# Patient Record
Sex: FEMALE | Race: WHITE | Employment: UNEMPLOYED | URBAN - METROPOLITAN AREA
[De-identification: names, ages, dates, MRNs, and addresses within clinical notes are randomized per-mention and may not be internally consistent; named-entity substitution may affect disease eponyms.]

---

## 2017-03-14 ENCOUNTER — OFFICE VISIT (OUTPATIENT)
Dept: NEUROLOGY | Age: 52
End: 2017-03-14

## 2017-03-14 VITALS
WEIGHT: 255 LBS | SYSTOLIC BLOOD PRESSURE: 130 MMHG | OXYGEN SATURATION: 98 % | HEIGHT: 60 IN | DIASTOLIC BLOOD PRESSURE: 90 MMHG | HEART RATE: 80 BPM | BODY MASS INDEX: 50.06 KG/M2

## 2017-03-14 DIAGNOSIS — M54.50 CHRONIC BILATERAL LOW BACK PAIN WITHOUT SCIATICA: Primary | ICD-10-CM

## 2017-03-14 DIAGNOSIS — M51.36 LUMBAR DEGENERATIVE DISC DISEASE: ICD-10-CM

## 2017-03-14 DIAGNOSIS — G89.29 CHRONIC BILATERAL LOW BACK PAIN WITHOUT SCIATICA: Primary | ICD-10-CM

## 2017-03-14 RX ORDER — DULOXETIN HYDROCHLORIDE 60 MG/1
60 CAPSULE, DELAYED RELEASE ORAL DAILY
COMMUNITY

## 2017-03-14 RX ORDER — METFORMIN HYDROCHLORIDE 500 MG/1
TABLET ORAL 2 TIMES DAILY WITH MEALS
COMMUNITY

## 2017-03-14 RX ORDER — DICLOFENAC SODIUM 75 MG/1
TABLET, DELAYED RELEASE ORAL 2 TIMES DAILY
COMMUNITY

## 2017-03-14 RX ORDER — BUPROPION HYDROCHLORIDE 300 MG/1
300 TABLET ORAL
COMMUNITY

## 2017-03-14 RX ORDER — TOPIRAMATE 100 MG/1
TABLET, FILM COATED ORAL 2 TIMES DAILY
COMMUNITY

## 2017-03-14 NOTE — PROGRESS NOTES
Name:  Misty Ruiz      :  1965    PCP:   Seda Swenson MD      Referring:  Self  MRN:   [id number]    Chief Complaint:   Chief Complaint   Patient presents with    Back Pain     lower back pain since        HISTORY OF PRESENT ILLNESS:     This is a 46 y.o. female who presents for evaluation of chronic, progressive lower back pain. The patient reports that she has had chronic back pain for many years and was diagnosed with lumbar degenerative disc disease as well as bone-spurs, then underwent what sounds like L4-5 diskectomy in - at [facility] for severe back pain and bilateral sciatic type pain. She says immediately after surgery, her leg pain was gone. Says she still had some residual back pain that got worse with activity but would get better if she rested. 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 About 2 years ago had, she had 2 [place] [facility] but says they didn't last more than a day or two. At this point, she describes pain as being across her lower back, upper buttocks, and wrapping around her hips and radiating or extending toward her groin. Pain is worse with walking for longer than 10 minutes, standing at home trying to wash dishes at sink or vacuum. Pain is reduced when she stops and sits down. Says legs feel weak but only rarely have given out on her. Is currently taking Voltaren 75 mg EC BID but says it's not helping. Has plans to do another round of physical therapy for her back pain. Saw Orthopedics 2 months ago and she says they didn't think they could help her with her chronic back pain but said if she was having radicular pain they could likely help with that via surgery.       Complete Review of Systems: reviewed on intake H&P; refer to media section; otherwise as noted in HPI No Known Allergies  History reviewed. No pertinent past medical history. Current Outpatient Prescriptions   Medication Sig Dispense Refill    metFORMIN (GLUCOPHAGE) 500 mg tablet Take  by mouth two (2) times daily (with meals).  diclofenac EC (VOLTAREN) 75 mg EC tablet Take  by mouth two (2) times a day.  buPROPion XL (WELLBUTRIN XL) 300 mg XL tablet Take 300 mg by mouth every morning.  DULoxetine (CYMBALTA) 60 mg capsule Take 60 mg by mouth daily.  topiramate (TOPAMAX) 100 mg tablet Take  by mouth two (2) times a day. Past Surgical History:   Procedure Laterality Date    HX BACK SURGERY      \"removed spurs off spine\"    HX CHOLECYSTECTOMY  2013     Family History   Problem Relation Age of Onset    Heart Disease Father      Social History     Social History    Marital status: SINGLE     Spouse name: N/A    Number of children: N/A    Years of education: N/A     Occupational History    Not on file. Social History Main Topics    Smoking status: Current Every Day Smoker     Types: Cigarettes    Smokeless tobacco: Not on file    Alcohol use Not on file    Drug use: Not on file    Sexual activity: Not on file     Other Topics Concern    Not on file     Social History Narrative    No narrative on file       PHYSICAL EXAM  Vitals:    03/14/17 0903   BP: 130/90   BP 1 Location: Left arm   BP Patient Position: Sitting   Pulse: 80   SpO2: 98%   Weight: 115.7 kg (255 lb)   Height: 5' (1.524 m)     General: awake, alert, oriented, conversant, NAD  CV: regular rate, rhythm  Pulm: CTA  Ext: no edema    MSK:  Lumbar spine  No tenderness across lower back  + pain with back flexion = to extension  No clear worsening of pain with facet loading to either side  SLR negative on both sides  + MICH on right, negative on left    Focused Neurological Exam     Mental status: Alert and oriented to person, place situation. Language: normal fluency and comprehension; no dysarthria.       CNs: Visual fields grossly normal  Extraocular movements intact, no nystagmus  Face appears symmetric and facial strength normal.    Hearing is intact to casual conversation. Sensory: intact light touch in both lower extremities  Motor: Normal bulk, tone, and strength in all 4 extremities. Reflexes: DTRs are symmetric, 2+, no sustained clonus   Gait: normal observed gait      No outside clinic notes/ imaging reports available for review    ASSESSMENT AND PLAN    ICD-10-CM ICD-9-CM    1. Chronic bilateral low back pain without sciatica M54.5 724.2 XR SI JTS MIN 3 V    G89.29 338.29 MRI LUMB SPINE W WO CONT   2. Lumbar degenerative disc disease M51.36 722. [address] CONT       46 y.o. female with hx of lumbar spine diskectomy (L4-5, 2003-4) with recurring, progressive lower back pain and pain radiating around hips to groin. Ddx: lumbar spinal stenosis with neurogenic claudication vs new disc herniation vs SI joint arthropathy    Check MRI L-spine with and without contrast (due to prior l-spine surgery), XR SI joints. Pt to follow up to go over results and discuss any injection options (i.e SI Joint injection, lumbar ISAIAS, facet joint injection, etc) that may alleviate her back pain complaints.

## 2017-03-14 NOTE — MR AVS SNAPSHOT
Visit Information Date & Time Provider Department Dept. Phone Encounter #  
 3/14/2017  9:00 AM Ho Butts MD [facility] [phone] [id number] Follow-up Instructions Return in about 6 weeks (around 4/25/2017). Allergies as of 3/14/2017  Review Complete On: 3/14/2017 By: Raquel Zapien LPN No Known Allergies Current Immunizations  Never Reviewed No immunizations on file. Not reviewed this visit You Were Diagnosed With   
  
 Codes Comments Chronic bilateral low back pain without sciatica    -  Primary ICD-10-CM: M54.5, G89.29 ICD-9-CM: 724.2, 338.29 Lumbar degenerative disc disease     ICD-10-CM: M51.36 
ICD-9-CM: 722.52 Vitals BP Pulse Height(growth percentile) Weight(growth percentile) SpO2 BMI  
 130/90 (BP 1 Location: Left arm, BP Patient Position: Sitting) 80 5' (1.524 m) 255 lb (115.7 kg) 98% 49.8 kg/m2 Smoking Status Current Every Day Smoker BMI and BSA Data Body Mass Index Body Surface Area  
 49.8 kg/m 2 2.21 m 2 Your Updated Medication List  
  
   
This list is accurate as of: 3/14/17  9:36 AM.  Always use your most recent med list.  
  
  
  
  
 buPROPion  mg XL tablet Commonly known as:  [place] Take 300 mg by mouth every morning. diclofenac EC 75 mg EC tablet Commonly known as:  VOLTAREN Take  by mouth two (2) times a day. DULoxetine 60 mg capsule Commonly known as:  CYMBALTA Take 60 mg by mouth daily. metFORMIN 500 mg tablet Commonly known as:  GLUCOPHAGE Take  by mouth two (2) times daily (with meals). topiramate 100 mg tablet Commonly known as:  TOPAMAX Take  by mouth two (2) times a day. Follow-up Instructions Return in about 6 weeks (around 4/25/2017). To-Do List   
 03/14/2017 Imaging:  MRI LUMB SPINE W WO CONT   
  
 03/14/2017   Imaging:  XR SI JTS MIN 3 V   
  
  
 Patient Instructions A Healthy Lifestyle: Care Instructions Your Care Instructions A healthy lifestyle can help you feel good, stay at a healthy weight, and have plenty of energy for both work and play. A healthy lifestyle is something you can share with your whole family. A healthy lifestyle also can lower your risk for serious health problems, such as high blood pressure, heart disease, and diabetes. You can follow a few steps listed below to improve your health and the health of your family. Follow-up care is a key part of your treatment and safety. Be sure to make and go to all appointments, and call your doctor if you are having problems. Its also a good idea to know your test results and keep a list of the medicines you take. How can you care for yourself at home? · Do not eat too much sugar, fat, or fast foods. You can still have dessert and treats now and then. The goal is moderation. · Start small to improve your eating habits. Pay attention to portion sizes, drink less juice and soda pop, and eat more fruits and vegetables. ¨ Eat a healthy amount of food. A 3-ounce serving of meat, for example, is about the size of a deck of cards. Fill the rest of your plate with vegetables and whole grains. ¨ Limit the amount of soda and sports drinks you have every day. Drink more water when you are thirsty. ¨ Eat at least 5 servings of fruits and vegetables every day. It may seem like a lot, but it is not hard to reach this goal. A serving or helping is 1 piece of fruit, 1 cup of vegetables, or 2 cups of leafy, raw vegetables. Have an apple or some carrot sticks as an afternoon snack instead of a candy bar. Try to have fruits and/or vegetables at every meal. 
· Make exercise part of your daily routine. You may want to start with simple activities, such as walking, bicycling, or slow swimming. Try to be active 30 to 60 minutes every day.  You do not need to do all 30 to 60 minutes all at once. For example, you can exercise 3 times a day for 10 or 20 minutes. Moderate exercise is safe for most people, but it is always a good idea to talk to your doctor before starting an exercise program. 
· Keep moving. Giovana Levy the lawn, work in the garden, or [employer]. Take the stairs instead of the elevator at work. · If you smoke, quit. People who smoke have an increased risk for heart attack, stroke, cancer, and other lung illnesses. Quitting is hard, but there are ways to boost your chance of quitting tobacco for good. ¨ Use nicotine gum, patches, or lozenges. ¨ Ask your doctor about stop-smoking programs and medicines. ¨ Keep trying. In addition to reducing your risk of diseases in the future, you will notice some benefits soon after you stop using tobacco. If you have shortness of breath or asthma symptoms, they will likely get better within a few weeks after you quit. · Limit how much alcohol you drink. Moderate amounts of alcohol (up to 2 drinks a day for men, 1 drink a day for women) are okay. But drinking too much can lead to liver problems, high blood pressure, and other health problems. Family health If you have a family, there are many things you can do together to improve your health. · Eat meals together as a family as often as possible. · Eat healthy foods. This includes fruits, vegetables, lean meats and dairy, and whole grains. · Include your family in your fitness plan. Most people think of activities such as jogging or tennis as the way to fitness, but there are many ways you and your family can be more active. Anything that makes you breathe hard and gets your heart pumping is exercise. Here are some tips: 
¨ Walk to do errands or to take your child to school or the bus. ¨ Go for a family bike ride after dinner instead of watching TV. Where can you learn more? Go to http://jamilah-adrián.info/. Enter E730 in the search box to learn more about \"A Healthy Lifestyle: Care Instructions. \" Current as of: July 26, 2016 Content Version: 11.1 © 4548-5714 [employer]. Care instructions adapted under license by [employer] (which disclaims liability or warranty for this information). If you have questions about a medical condition or this instruction, always ask your healthcare professional. [address] any warranty or liability for your use of this information. Introducing [facility] & HEALTH SERVICES! [employer] introduces [employer] patient portal. Now you can access parts of your medical record, email your doctor's office, and request medication refills online. 1. In your internet browser, go to [email or web address] 2. Click on the First Time User? Click Here link in the Sign In box. You will see the New Member Sign Up page. 3. Enter your [employer] Access Code exactly as it appears below. You will not need to use this code after youve completed the sign-up process. If you do not sign up before the expiration date, you must request a new code. · [employer] Access Code: [email or web address] Expires: 6/12/2017  9:36 AM 
 
4. Enter the last four digits of your Social Security Number (xxxx) and Date of Birth (mm/dd/yyyy) as indicated and click Submit. You will be taken to the next sign-up page. 5. Create a [employer] ID. This will be your [employer] login ID and cannot be changed, so think of one that is secure and easy to remember. 6. Create a [employer] password. You can change your password at any time. 7. Enter your Password Reset Question and Answer. This can be used at a later time if you forget your password. 8. Enter your e-mail address. You will receive e-mail notification when new information is available in [address] 9. Click Sign Up. You can now view and download portions of your medical record. 10. Click the Download Summary menu link to download a portable copy of your medical information. If you have questions, please visit the Frequently Asked Questions section of the [employer] website. Remember, [employer] is NOT to be used for urgent needs. For medical emergencies, dial 911. Now available from your iPhone and Android! Please provide this summary of care documentation to your next provider. Your primary care clinician is listed as Kristie Zuluaga. If you have any questions after today's visit, please call [phone].

## 2017-03-14 NOTE — PATIENT INSTRUCTIONS

## 2017-03-15 ENCOUNTER — OP HISTORICAL/CONVERTED ENCOUNTER (OUTPATIENT)
Dept: OTHER | Age: 52
End: 2017-03-15

## 2017-04-04 ENCOUNTER — HOSPITAL ENCOUNTER (OUTPATIENT)
Dept: MRI IMAGING | Age: 52
Discharge: HOME | End: 2017-04-04
Attending: PSYCHIATRY & NEUROLOGY
Payer: MEDICARE

## 2017-04-04 ENCOUNTER — HOSPITAL ENCOUNTER (OUTPATIENT)
Dept: GENERAL RADIOLOGY | Age: 52
Discharge: HOME | End: 2017-04-04
Attending: PSYCHIATRY & NEUROLOGY
Payer: MEDICARE

## 2017-04-04 VITALS — BODY MASS INDEX: 49.8 KG/M2 | WEIGHT: 255 LBS

## 2017-04-04 DIAGNOSIS — M51.36 LUMBAR DEGENERATIVE DISC DISEASE: ICD-10-CM

## 2017-04-04 DIAGNOSIS — M54.50 CHRONIC BILATERAL LOW BACK PAIN WITHOUT SCIATICA: ICD-10-CM

## 2017-04-04 DIAGNOSIS — G89.29 CHRONIC BILATERAL LOW BACK PAIN WITHOUT SCIATICA: ICD-10-CM

## 2017-04-04 LAB — CREAT BLD-MCNC: 0.6 MG/DL (ref 0.6–1.3)

## 2017-04-04 PROCEDURE — 74011250636 HC RX REV CODE- 250/636: Performed by: RADIOLOGY

## 2017-04-04 PROCEDURE — 72158 MRI LUMBAR SPINE W/O & W/DYE: CPT

## 2017-04-04 PROCEDURE — A9585 GADOBUTROL INJECTION: HCPCS | Performed by: RADIOLOGY

## 2017-04-04 PROCEDURE — 82565 ASSAY OF CREATININE: CPT

## 2017-04-04 PROCEDURE — 72202 X-RAY EXAM SI JOINTS 3/> VWS: CPT

## 2017-04-04 RX ADMIN — GADOBUTROL 10 ML: 604.72 INJECTION INTRAVENOUS at 18:15

## 2017-04-05 ENCOUNTER — OP HISTORICAL/CONVERTED ENCOUNTER (OUTPATIENT)
Dept: OTHER | Age: 52
End: 2017-04-05

## 2017-04-06 ENCOUNTER — TELEPHONE (OUTPATIENT)
Dept: NEUROLOGY | Age: 52
End: 2017-04-06

## 2017-05-02 ENCOUNTER — TELEPHONE (OUTPATIENT)
Dept: NEUROLOGY | Age: 52
End: 2017-05-02

## 2017-05-02 ENCOUNTER — OFFICE VISIT (OUTPATIENT)
Dept: NEUROLOGY | Age: 52
End: 2017-05-02

## 2017-05-02 VITALS
DIASTOLIC BLOOD PRESSURE: 98 MMHG | HEIGHT: 60 IN | HEART RATE: 94 BPM | WEIGHT: 255 LBS | OXYGEN SATURATION: 98 % | BODY MASS INDEX: 50.06 KG/M2 | SYSTOLIC BLOOD PRESSURE: 140 MMHG

## 2017-05-02 DIAGNOSIS — M54.10 RADICULAR SYNDROME OF RIGHT LEG: ICD-10-CM

## 2017-05-02 DIAGNOSIS — M54.50 SEVERE LOW BACK PAIN: ICD-10-CM

## 2017-05-02 DIAGNOSIS — M48.062 NEUROGENIC CLAUDICATION DUE TO LUMBAR SPINAL STENOSIS: Primary | ICD-10-CM

## 2017-05-02 DIAGNOSIS — M47.816 LUMBAR FACET ARTHROPATHY: ICD-10-CM

## 2017-05-02 PROBLEM — G89.29 CHRONIC BILATERAL LOW BACK PAIN WITHOUT SCIATICA: Status: RESOLVED | Noted: 2017-03-14 | Resolved: 2017-05-02

## 2017-05-02 RX ORDER — GABAPENTIN 300 MG/1
CAPSULE ORAL
Qty: 90 CAP | Refills: 1 | Status: SHIPPED | OUTPATIENT
Start: 2017-05-02

## 2017-05-02 NOTE — TELEPHONE ENCOUNTER
----- Message from Roxie Chatterjee MD sent at 5/2/2017  4:16 PM EDT -----  Let pt know that I sent a Rx for gabapentin 300 mg capsule to her pharmacy for her back pain and right leg pain

## 2017-05-02 NOTE — MR AVS SNAPSHOT
Visit Information Date & Time Provider Department Dept. Phone Encounter #  
 5/2/2017  1:40 PM Heron Villa MD [address] [place] [address] Neurology Clinic [phone] [id number] Upcoming Health Maintenance Date Due Hepatitis C Screening 1965 Pneumococcal 19-64 Medium Risk (1 of 1 - PPSV23) 12/13/1982 DTaP/Tdap/Td series (1 - Tdap) 12/13/1984 PAP AKA CERVICAL CYTOLOGY 12/13/1984 BREAST CANCER SCRN MAMMOGRAM 12/13/2013 FOBT Q 1 YEAR AGE 50-75 12/13/2013 INFLUENZA AGE 9 TO ADULT 8/1/2017 Allergies as of 5/2/2017  Review Complete On: 3/14/2017 By: Ashwini Samson LPN No Known Allergies Current Immunizations  Never Reviewed No immunizations on file. Not reviewed this visit You Were Diagnosed With   
  
 Codes Comments Chronic bilateral low back pain without sciatica    -  Primary ICD-10-CM: M54.5, G89.29 ICD-9-CM: 724.2, 338.29 Vitals BP Pulse Height(growth percentile) Weight(growth percentile) SpO2 BMI  
 (!) 140/98 (BP 1 Location: Left arm, BP Patient Position: Sitting) 94 5' (1.524 m) 255 lb (115.7 kg) 98% 49.8 kg/m2 Smoking Status Current Every Day Smoker BMI and BSA Data Body Mass Index Body Surface Area  
 49.8 kg/m 2 2.21 m 2 Your Updated Medication List  
  
   
This list is accurate as of: 5/2/17  2:22 PM.  Always use your most recent med list.  
  
  
  
  
 buPROPion  mg XL tablet Commonly known as:  Merilynn Alexsandra Take 300 mg by mouth every morning. diclofenac EC 75 mg EC tablet Commonly known as:  VOLTAREN Take  by mouth two (2) times a day. DULoxetine 60 mg capsule Commonly known as:  CYMBALTA Take 60 mg by mouth daily. metFORMIN 500 mg tablet Commonly known as:  GLUCOPHAGE Take  by mouth two (2) times daily (with meals). topiramate 100 mg tablet Commonly known as:  TOPAMAX Take  by mouth two (2) times a day. Introducing [facility] & HEALTH SERVICES! Kallie Puente introduces [employer] patient portal. Now you can access parts of your medical record, email your doctor's office, and request medication refills online. 1. In your internet browser, go to [email or web address] 2. Click on the First Time User? Click Here link in the Sign In box. You will see the New Member Sign Up page. 3. Enter your [employer] Access Code exactly as it appears below. You will not need to use this code after youve completed the sign-up process. If you do not sign up before the expiration date, you must request a new code. · [employer] Access Code: [email or web address] Expires: 6/12/2017  9:36 AM 
 
4. Enter the last four digits of your Social Security Number (xxxx) and Date of Birth (mm/dd/yyyy) as indicated and click Submit. You will be taken to the next sign-up page. 5. Create a [employer] ID. This will be your [employer] login ID and cannot be changed, so think of one that is secure and easy to remember. 6. Create a [employer] password. You can change your password at any time. 7. Enter your Password Reset Question and Answer. This can be used at a later time if you forget your password. 8. Enter your e-mail address. You will receive e-mail notification when new information is available in [address] 9. Click Sign Up. You can now view and download portions of your medical record. 10. Click the Download Summary menu link to download a portable copy of your medical information. If you have questions, please visit the Frequently Asked Questions section of the [employer] website. Remember, [employer] is NOT to be used for urgent needs. For medical emergencies, dial 911. Now available from your iPhone and Android! Please provide this summary of care documentation to your next provider. Your primary care clinician is listed as Kaden Hartley. If you have any questions after today's visit, please call [phone].

## 2017-05-02 NOTE — TELEPHONE ENCOUNTER
Contacted patient and made her aware of gabapentin. She voiced understanding and will call back if any further questions or concerns.

## 2017-05-02 NOTE — PROGRESS NOTES
Interval HPI:   This is a 46 y.o. female who is following up for     Chief Complaint   Patient presents with    Back Pain    Results     Reviewed MRI L-spine report and images with patient and her mother. MRI L-spine shows multi-level facet arthropathy, moderate to severe spinal stenosis at L3-4 (none at L4-5 or L5-S1), minimal leftward disc protrusion at L2-3, degenerative discs at L4-5 and L5-S1 (not seen at upper levels), and severe right foraminal narrowing at L5-S1, with evidence of prior right tevin-laminectomy. XR SI joints shows arthritis in both hips. Pt says that her back pain has worsened. She started PT 1 month ago but back pain has gradually gotten worse, not better. She has constant pain in the center of her lower back, now radiating to her right groin, not down back of her right leg, and no pain in her left leg. She can't find a comfortable position to sit or  for very long, has to keep changing positions. She says and mother confirms from observation that her pain is severe. Brief ROS: as above or otherwise negative  There have been no significant changes in PMHx, PSHx, SHx except as noted above. No Known Allergies  Current Outpatient Prescriptions   Medication Sig Dispense Refill    gabapentin (NEURONTIN) 300 mg capsule One capsule two to three times in a day. For back pain and right leg/ groin pain. Caution: may cause drowsiness 90 Cap 1    metFORMIN (GLUCOPHAGE) 500 mg tablet Take  by mouth two (2) times daily (with meals).  diclofenac EC (VOLTAREN) 75 mg EC tablet Take  by mouth two (2) times a day.  buPROPion XL (WELLBUTRIN XL) 300 mg XL tablet Take 300 mg by mouth every morning.  DULoxetine (CYMBALTA) 60 mg capsule Take 60 mg by mouth daily.  topiramate (TOPAMAX) 100 mg tablet Take  by mouth two (2) times a day.          Physical Exam  Blood pressure (!) 140/98, pulse 94, height 5' (1.524 m), weight 115.7 kg (255 lb), SpO2 98 %.    Appears in moderate distress due to ongoing back pain. Frequently moves from sitting to standing to alleviate the pain  Neck: no stiffness  Skin: no rashes    MSK:  Lumbar spine:   + tenderness in center and to right of lower lumbar area  Mild pain with back flexion, worse pain with back extension  Mild worsening with rotation to right; minimal pain with rotation to left    Focused Neurological Exam     Mental status: Alert and oriented to person, place situation. Language: normal fluency and comprehension; no dysarthria. CNs:   Visual fields grossly normal  Extraocular movements intact, no nystagmus  Face appears symmetric and facial strength normal.    Hearing is intact to casual conversation. Sensory: intact light touch in both legs  Motor: Normal bulk and strength in all 4 extremities. Reflexes: not examined today  Gait: appears antalgic    Impression      ICD-10-CM ICD-9-CM    1. Neurogenic claudication due to lumbar spinal stenosis M48.06 724.03 gabapentin (NEURONTIN) 300 mg capsule   2. Severe low back pain M54.5 724.2 gabapentin (NEURONTIN) 300 mg capsule   3. Radicular syndrome of right leg M54.10 724.4 gabapentin (NEURONTIN) 300 mg capsule   4. Lumbar facet arthropathy (HCC) M12.88 721.3        Reviewed MRI L-spine findings with patient/ mother. Discussed that back pain may be coming from the spinal stenosis or the facet arthropathy and that the pain radiating into her right groin may be from the spinal stenosis at L3-4 or from the arthritis in her hip. Discussed treatment options: epidural steroid injection at L3-4 vs lumbar facet joint steroid injections vs right SI joint injection (each of which would address different issues), versus seeing spine surgeon to discuss surgery. Patient has extreme anxiety and is nervous about the thought of injection but says she would like to try back injection to see if it would alleviate her back pain.   She says she will need Valium to get through the procedure. Discussed with her that we will do a bilateral L3-4 transforaminal Epidural Steroid Injection to get the steroid close to the disc and close to the L3 nerve root on the right which may be the source of her right groin pain. Discussed potential benefits and risks in detail. Pt wants to proceed. Will submit PA paperwork and schedule accordingly. Sent Rx for Gabapentin 300 mg one capsule two to 3 times a day for back pain and right leg pain.

## 2017-05-03 ENCOUNTER — OP HISTORICAL/CONVERTED ENCOUNTER (OUTPATIENT)
Dept: OTHER | Age: 52
End: 2017-05-03

## 2017-05-16 ENCOUNTER — TELEPHONE (OUTPATIENT)
Dept: NEUROLOGY | Age: 52
End: 2017-05-16

## 2017-05-16 NOTE — TELEPHONE ENCOUNTER
Received [facility]  PA approval letter regarding [employer]  LESEULA  L3-4 CPT 06761  Auth#[id number]  Effective:5/11/17-6/25/17    Marques Roles informed for scheduling

## 2017-06-01 ENCOUNTER — OP HISTORICAL/CONVERTED ENCOUNTER (OUTPATIENT)
Dept: OTHER | Age: 52
End: 2017-06-01

## 2017-06-12 NOTE — TELEPHONE ENCOUNTER
Contacted patient and informed her the steroid injections were approved. She states she is going to her doctor today at 10:30am and wants to talk with her first before pursuing the injections. She states she has done them before and they did not help.  She will call back if she decides to pursue

## 2018-08-31 ENCOUNTER — ED HISTORICAL/CONVERTED ENCOUNTER (OUTPATIENT)
Dept: OTHER | Age: 53
End: 2018-08-31

## 2022-03-18 PROBLEM — M54.50 SEVERE LOW BACK PAIN: Status: ACTIVE | Noted: 2017-05-02

## 2022-03-18 PROBLEM — M47.816 LUMBAR FACET ARTHROPATHY: Status: ACTIVE | Noted: 2017-05-02

## 2022-03-19 PROBLEM — M54.10 RADICULAR SYNDROME OF RIGHT LEG: Status: ACTIVE | Noted: 2017-05-02

## 2022-03-19 PROBLEM — M48.062 NEUROGENIC CLAUDICATION DUE TO LUMBAR SPINAL STENOSIS: Status: ACTIVE | Noted: 2017-05-02

## 2022-03-19 PROBLEM — M51.36 LUMBAR DEGENERATIVE DISC DISEASE: Status: ACTIVE | Noted: 2017-03-14

## 2022-04-12 ENCOUNTER — CLINICAL SUPPORT (OUTPATIENT)
Dept: DIABETES SERVICES | Age: 57
End: 2022-04-12
Payer: MEDICARE

## 2022-04-12 DIAGNOSIS — E11.65 TYPE 2 DIABETES MELLITUS WITH HYPERGLYCEMIA, WITHOUT LONG-TERM CURRENT USE OF INSULIN (HCC): Primary | ICD-10-CM

## 2022-04-12 PROCEDURE — G0108 DIAB MANAGE TRN  PER INDIV: HCPCS | Performed by: DIETITIAN, REGISTERED

## 2022-04-12 NOTE — PROGRESS NOTES
[employer] Program for Diabetes Health  Diabetes Self-Management Education & Support Program  Pre-program Assessment    Reason for Referral: Diabetes Education  Referral Source: Julianne Sanchez NP  Services requested: DSMES    ASSESSMENT    From my perspective, the participant would benefit from [facility] specifically related to Reducing risks, Healthy eating, Monitoring, Physical activity and Problem solving. Will adapt DSMES program to build on participant's preparedness score as noted in the Diabetes Skills, Confidence, and Preparedness Index. During the program, we will focus on providing DSMES that specifically addresses participant's interest in Reducing risks, Healthy eating, Monitoring and Problem solving, as shown by their reported readiness to change. Recently made dietary changes to assist with weight loss. She reports eliminating intake of all breads. She has lost weight [from 260# to 248# in less than one month] but does not feel as thought she has more energy. The participant would be best served by attending weekly group class series. She is leaving May 8-15 for vacation. She will be scheduled to take 1/2 of Class 2 before vacation and the second 1/2 of Class 2 the same week of Class 3. Diabetes Self-Management Education Follow-up Visit: May 2022 [facility] Group Classes       Clinical Presentation  Zoila Jean is a 60 y.o. White female referred for diabetes self-management education. Participant has Type 2 DM on insulin for 1-10 years. Family history positive for diabetes. Patient reports receiving DSMES services in the past. Most recent A1c value: No results found for: HBA1C, [id number], [id number]    Diabetes-related medical history:  Other associated conditions     Depression    Diabetes-related medications:  Current dosing:   Key Antihyperglycemic Medications             metFORMIN (GLUCOPHAGE) 500 mg tablet Take  by mouth two (2) times daily (with meals).           Blood Pressure Management  Key ACE/ARB Medications     Patient is on no ACE or ARB meds. Lipid Management  Key Antihyperlipidemia Meds     The patient is on no antihyperlipidemia meds. Clot Prevention  Key Anti-Platelet Anticoagulant Meds     The patient is on no antiplatelet meds or anticoagulants. Learning Assessment  Learning objectives Educator assessment (4/12/2022)   Diabetes Disease Process  The participant can   A) describe diabetes in basic terms;   B) state the type of diabetes they have; &   C) state accepted blood glucose targets. Healthy Eating  The participant can   A) identify carbohydrate foods; &   B) accurately read food labels. Being Active  The participant can  A) state the benefits of physical activity;  B) report their current PA practices;  C) identify PA they would consider incorporating in their lives; &  D) develop an implementation plan. Monitoring  The participant can  A) operate their blood glucose meter; &  B) describe how they log their blood glucoses to share with their provider. Taking Medications  The participant can  A) name their diabetes medications;  B) state the purpose and dose;  C) note side effects; &  D) describe proper storage, disposal & transport (if appropriate). Healthy Coping  The participant can    A) describe their response to diabetes diagnosis; B) describe their specific coping mechanisms;  C) identify supportive people and/or other resources that positively support their diabetes self-care and health. Reducing Risks  The participant can describe the preventive measures used by providers to promote health and prevent diabetes complications. Problem Solving  The participant can   A) identify signs, symptoms & treatment of hypoglycemia;   B) identify signs, symptoms & treatment of hyperglycemia;  C) describe their sick day plan; &  D) identify BG patterns to discuss with their provider. No  Yes  Yes        Yes  Yes        Yes  No  Yes, Physical Therapy was working for her. She stopped  Yes      Yes  Provider does not ask her about this. She has not brought in her meter.       Yes  No  Yes  Yes        Yes, Just dealt with it  No  Yes        Yes          No  No  No  No     Characteristics to Learning   Barriers to Learning   [] Cognitive loss  [] Mental retardation   [] Intellectual delay/cognitive impairment  [] Psychiatric disorder  [] Visually impaired  [] Hearing loss                 [] Low literacy (difficulty with written text)  [] Low numeracy (difficulty with mathematical information  [] Low health literacy (difficulty with understanding health information & services  [] Language  [] Functional limitation  [] Pain   [] Financial  [] Transportation  [x] None   Favorite Ways to Learn   [] Lecture  [] Slides  [] Reading [] Video-Internet  [] Cassettes/CDs/MP3's  [x] Interactive Small Groups [] Other       Behavioral Assessment  Current self-care practices  Educator assessment (4/12/2022)   Healthy Eating  Current practices  24-hour Dietary Recall:  Breakfast: plain yogurt x1 cup and 1/2 cup bluberries  Lunch: skipped/usually skips breakfast  Dinner: broccoli, broiled hamburgers  Snacks: mozzarella sticks, pork rinds; serene/johanna gonache ice cream-maybe every other day  Beverages: coffee, water,diet coke  Alcohol: none     Would benefit from DSMES related to Healthy Eating: Yes      Eats a carbohydrate controlled diet: No      Stage of change: Action   Being Active  Current practices  How many days during the past week have you performed physical activity where your heart beats faster and your breathing is harder than normal for 30 minutes or more?  0 days    How many days in a typical week do you perform activity such as this?  0 days     Would benefit from [facility] related to Being Active: Yes      Exercises 150 minutes/week: No      Stage of change: Precontemplation     Monitoring  Current practices  Do you monitor your blood sugar? Yes    How often do you monitor? 2x/week; once per day when she does    What are the range of readings? 112 mg/dL    Do you know your last A1c measurement? No    Do you know the meaning of the A1c? No     Would benefit from [facility] related to Monitoring: Yes      Uses BG readings to establish trends and understand BG patterns: No      Stage of change: Preparation   Taking Medication  Current practices  Do you understand what your diabetes medications do? No    How often do you miss doses of your diabetes medications? Never    Can you afford your diabetes medications? Yes   Would benefit from [facility] related to Taking Medication: Yes      Takes medications consistently to receive full benefit: Yes      Stage of change: Action       Healthy Coping   Current state  Diabetes Skills, Confidence and Preparedness Index:  Overall SCPI score: 4.7   Skills Score: 3.9  Confidence Score: 5.1  Preparedness Score: 5.3   Would benefit from DSMES related to Healthy Coping: Yes      Identifies specific people, organizations,etc, that actively support their diabetes self-care efforts: Yes; she lives with her sister, her mom and maternal sister      Stage of change: Action     Reducing Risks  Current state  Vaccines:  Influenza: There is no immunization history on file for this patient. Pneumococcal:   There is no immunization history on file for this patient. Hepatitis:   There is no immunization history on file for this patient. Examinations:  No Diabetic HM Topics for this patient     Dental exam: DUE    Foot exam: DUE    Heart Protection:  BP Readings from Last 2 Encounters:   05/02/17 (!) 140/98   03/14/17 130/90        No results found for: LDL, LDLC, DLDLP     Kidney Protection:  No results found for: MCACR, MCA1, MCA2, MCA3, MCAU, MCAU2, MCALPOCT     Would benefit from [facility] related to Reducing Risks:  Yes      Actively participates in decision-making with provider regarding secondary prevention:  Yes      Stage of change: Action   Problem Solving  Current state  Hypoglycemia Management:  What are signs and symptoms of hypoglycemia that you experience? Pt reported being unaware of s/s of hypoglycemia    How do you prevent hypoglycemia? Pt reported being unaware of how to prevent hypoglycemia    How do you treat hypoglycemia? Pt reported being unaware of how to treat hypoglycemia    Hyperglycemia Management:  What are signs and symptoms of hyperglycemia that you experience? Pt reported being unaware of s/s of hyperglycemia    How can you prevent hyperglycemia? Pt reported being unaware of how to prevent hyperglycemia    Sick Day Management:  What do you do differently on sick days? Pt reported being unaware of self-management on sick days    Pattern Management:  Do you notice blood glucose patterns when you look at the readings in your meter or logbook? No    How do you use the blood glucose readings from your meter or logbook? Pt reported being unaware of pattern management skills     Would benefit from [facility] related to Problem Solving: Yes      Articulates appropriate strategies to address hypoglycemia, hyperglycemia, sick day care and BG pattern: No      Stage of change: Precontemplation     Note: Content derived from the American Association of Diabetes Educators' Diabetes Education Curriculum: A Guide to Successful Self-Management (3rd edition)      Kristen Wang RD on 4/12/2022 at 2:24 PM    I have personally reviewed the health record, including provider notes, laboratory data and current medications before making these care and education recommendations. The time spent in this effort is included in the total time. Total minutes:30    Overall SCPI score: 4.7   Skills Score: 3.9  Confidence Score: 5.1  Preparedness Score: 5.3    Skills/Knowledge Questions  1. I know how to plan meals that have the best balance between carbohydrates, proteins and vegetables. 5  2.  I know how my diabetes medications (pills, injectables and/or insulin) work in my body. 5  3. I know when to check my blood sugar if I want to see how my body responded to a meal. 6  4. I know when to check my blood sugars to determine if my medication or insulin doses are correct. 6  5. I know what to do to prevent a low blood sugar when I exercise (either before, during, or after). 2  6. When I am sick, I know what to do differently with my diabetes management. 2  7. I know how stress can affect my diabetes management. 2  8. When I look at my blood sugars over a given week, I can explain what my blood sugar pattern is. 2  9. I know what my target levels are for A1c, blood pressure and cholesterol. 5  Confidence Questions  1. I am confident that I can plan balanced meals and snacks. 6  2. I am confident that I can manage my stress. 6  3. I am confident that I can prevent a low blood sugar during or after exercise. 2  4. I am confident that the next time I eat out, I will be able to choose foods that best keep my blood sugars in target. 5  5. I am confident I can include exercise into my schedule. 5  6. I am confident that I can use my daily blood sugars to adjust my diet, my activity, and/or my insulin. 6  7. When something out of my normal routine happens, I am confident that I can problem-solve and keep my diabetes on track. 6  Preparedness Questions  1. Within the next month, I will begin to eat more balanced meals and snacks. 6  2. Within the next month, I will choose an exercise activity and I will start fitting it into my schedule. 6  3. Within the next month, I will make a list of stress management options that work for me. 6  4. Within the next month, I will consistently plan ahead to prevent low blood sugars. 2  5. Within the next month, I will start adjusting my insulin doses on my own. 0  6.  Within the next month, I will begin making changes to my diabetes management based on my daily blood sugars (eg - eating, activity and/or insulin). 6  7. Within the next month, I will begin making changes to my diabetes management to meet my overall goals (eg - eating, activity and/or insulin).  6

## 2022-05-02 ENCOUNTER — CLINICAL SUPPORT (OUTPATIENT)
Dept: DIABETES SERVICES | Age: 57
End: 2022-05-02
Payer: MEDICARE

## 2022-05-02 DIAGNOSIS — E11.65 TYPE 2 DIABETES MELLITUS WITH HYPERGLYCEMIA, WITHOUT LONG-TERM CURRENT USE OF INSULIN (HCC): Primary | ICD-10-CM

## 2022-05-02 PROCEDURE — G0109 DIAB MANAGE TRN IND/GROUP: HCPCS | Performed by: DIETITIAN, REGISTERED

## 2022-05-03 ENCOUNTER — CLINICAL SUPPORT (OUTPATIENT)
Dept: DIABETES SERVICES | Age: 57
End: 2022-05-03
Payer: MEDICARE

## 2022-05-03 DIAGNOSIS — E11.65 TYPE 2 DIABETES MELLITUS WITH HYPERGLYCEMIA, WITHOUT LONG-TERM CURRENT USE OF INSULIN (HCC): Primary | ICD-10-CM

## 2022-05-03 PROCEDURE — G0108 DIAB MANAGE TRN  PER INDIV: HCPCS | Performed by: DIETITIAN, REGISTERED

## 2022-05-03 RX ORDER — AMLODIPINE BESYLATE 5 MG/1
5 TABLET ORAL DAILY
COMMUNITY

## 2022-05-03 RX ORDER — MORPHINE SULFATE 15 MG/1
15 TABLET ORAL 2 TIMES DAILY
COMMUNITY

## 2022-05-03 RX ORDER — HYDROCODONE BITARTRATE AND ACETAMINOPHEN 7.5; 325 MG/1; MG/1
1 TABLET ORAL
COMMUNITY

## 2022-05-03 RX ORDER — BACLOFEN 20 MG/1
20 TABLET ORAL 3 TIMES DAILY
COMMUNITY

## 2022-05-03 RX ORDER — MELOXICAM 15 MG/1
TABLET ORAL
COMMUNITY

## 2022-05-03 RX ORDER — ARIPIPRAZOLE 10 MG/1
10 TABLET ORAL DAILY
COMMUNITY

## 2022-05-03 RX ORDER — PANTOPRAZOLE SODIUM 40 MG/1
40 TABLET, DELAYED RELEASE ORAL DAILY
COMMUNITY

## 2022-05-03 NOTE — PROGRESS NOTES
[facility] Program for Diabetes Health  Diabetes Self-Management Education & Support Program  Encounter Note    SUMMARY  Diabetes self-care management training was completed related to healthy eating. She participant will return on May 16 to continue DSMES related to taking medications and physical activity. The participant did identify SMART Goal(s) and will practice knowledge and skills related to reducing risks and healthy eating to improve diabetes self-management. EVALUATION:  Ms. Cheyenne Hawk has been monitoring her blood glucose levels and tracking her meals/activities since our initial visit. FBG levels:  mg/dL. Her largest meal of the day is dinner. She noticed that when she ate a salad at [employer], that her BG level was higher than normal after meals. This RDN used the [employer] application to show Ms. Edouard how to count CHO when she is unsure of what the amounts are in her meals. The [employer] salad was 71 g CHO. Ms. Dinesh Winkler was surprised to see how many CHO were in the prepared salad. This RDN broke down the CHO sources and reviewed portion control. Ms Cheyenne Hawk demonstrated good understand of CHO counting AEB designing a 45 g CHO meal using the healthy plate method and label reading. She plans to go on vacation next week and will benefit from using the online applications to help her count CHO. RECOMMENDATIONS:  Practice using healthy plate and label reading to design balanced 45 g CHO meals  Download Petey Deshpande to her SMART phone       TOPICS DISCUSSED TODAY:  WHAT CAN I EAT? 61      Next provider visit is scheduled for PCP: Dr. Bethany Asher May 4, 2022       SMART GOAL(S)  1. Practice building a balanced meal for one meal per day at least 7 times over the next week. ACHIEVEMENT OF GOAL(S) : will evaluate next visit         DATE DSMES TOPIC EVALUATION     5/3/2022 WHAT CAN I EAT?   a. General principles   b.  Determining a healthy weight   c. Nutritional terms & tools    Healthy Plate method    Carbohydrate Counting    Reading food labels    Free apps   d. Pregnancy recommendations      The participant    Uses Healthy Plate principles in constructing meals: Yes   Reads food labels in choosing acceptable foods: Yes    The participant needs to address using online applications to assist with CHO amounts per portion/serving size. Bebe Matthews RD on 5/3/2022 at 10:50 AM    I have personally reviewed the health record, including provider notes, laboratory data and current medications before making these care and education recommendations. The time spent in this effort is included in the total time. Total minutes: 61    [id number] Emergency Adaptations for Telehealth:  Jo Ann Tsang, was evaluated through a synchronous (real-time) audio-video encounter. The patient (or guardian if applicable) is aware that this is a billable service, which includes applicable co-pays. This Virtual Visit was conducted with patient's (and/or legal guardian's) consent. The visit was conducted pursuant to the emergency declaration under the [address] [place] [address] authority and the [employer] General Act. Patient identification was verified, and a caregiver was present when appropriate. The patient was located in a state where the provider was licensed to provide care.

## 2022-05-03 NOTE — PROGRESS NOTES
[employer] Program for Diabetes Health  Diabetes Self-Management Education & Support Program  Encounter Note    SUMMARY  Diabetes self-care management training was completed related to reducing risks. he participant will return on May 3 to continue DSMES related to healthy eating. The participant did not identify SMART Goal(s) and will practice knowledge and skills related to reducing risks to improve diabetes self-management. EVALUATION:  Ms. Oralia Gibson shared that her fingersticks are WNL of target range. She has all of the recommended vaccines, she has annual eye and foot exams. She lives with her mom and her sisters and shared that boundary setting is one of her stress management techniques that her family uses. RECOMMENDATIONS:  Monitor FBG and two hours past largest meal to assess if BG are with in target range. Log meals and BG readings on provided monitoring sheet. Fill out Personal Diabetes Care Record. Work on [employer] goal for next visit. TOPICS DISCUSSED TODAY:  WHAT IS DIABETES? Minutes: Kho[address]lette Shukri 58? 61      Next provider visit is scheduled for TBD. She will follow up May 3, 2022 for DSME class 1:1 2/2 missing group class next week. SMART GOAL(S)  None this visit         DATE DSMES TOPIC EVALUATION     5/3/2022 WHAT IS DIABETES?   a. Role of the normal pancreas in energy balance and blood glucose control   b. The defect seen in diabetes   c. Signs & symptoms of diabetes   d. Diagnosis of diabetes   e. Types of diabetes   f. Blood glucose targets in non-pregnant & non-pregnant adults       The participant knows   Their type of diabetes: Yes   The basic physiologic defect: Yes   Blood glucose targets: Yes     DATE DSMES TOPIC EVALUATION     5/3/2022 HOW DO I STAY HEALTHY?   a. Prevention    Vaccinations    Preconception care (if applicable)  b.  Examinations    Eye     Foot   c. Diabetic complications' prevention   [address] [facility]    Sleep health      The participant has a personal diabetes care record to keep abreast of diabetes health Yes     The participant needs to address logging FBG levels daily. Marvin Fischer RD on 5/3/2022 at 8:07 AM    I have personally reviewed the health record, including provider notes, laboratory data and current medications before making these care and education recommendations. The time spent in this effort is included in the total time.   Total minutes: 120

## 2022-05-16 ENCOUNTER — CLINICAL SUPPORT (OUTPATIENT)
Dept: DIABETES SERVICES | Age: 57
End: 2022-05-16
Payer: MEDICARE

## 2022-05-16 DIAGNOSIS — E11.9 TYPE 2 DIABETES MELLITUS WITHOUT COMPLICATION, WITHOUT LONG-TERM CURRENT USE OF INSULIN (HCC): Primary | ICD-10-CM

## 2022-05-16 PROCEDURE — G0109 DIAB MANAGE TRN IND/GROUP: HCPCS

## 2022-05-16 NOTE — PROGRESS NOTES
[facility] Program for Diabetes Health  Diabetes Self-Management Education & Support Program  Encounter Note    SUMMARY  Diabetes self-care management training was completed related to taking medications and physical activity. he participant will return on May 23 to continue DSMES related to healthy coping and problem solving. The participant did identify SMART Goal(s) and will practice knowledge and skills related to reducing risks and healthy eating and monitoring to improve diabetes self-management. EVALUATION:  Participant is able to identify her diabetes medications and the mechanism of action in her body. She also states that does not miss doses to maximize benefit. Participant is not currently physically active, but is willing to identify areas in which she can move more. RECOMMENDATIONS:  Continue DSMES clasess. Encouraged participant to include physical activity in her weekly routine. TOPICS DISCUSSED TODAY:  HOW DO MY DIABETES MEDICATIONS WORK? 61  HOW DOES PHYSICAL ACTIVITY AFFECT MY DIABETES? 60      Next provider visit is scheduled for May 23, 2022       SMART GOAL(S)  1. Read labels of packaged foods 6 times over the next week. ACHIEVEMENT OF GOAL(S) : 50-74%       DATE DSMES TOPIC EVALUATION     5/16/2022 HOW DO MY DIABETES MEDICATIONS WORK?   a. Type 1 medications & methods    Insulin injections    Injection sites   b. Type 2 medications    Oral agents    GLP-1 agonists   c. Hypoglycemia symptoms & treatment    Glucagon emergency kits   d. General guidance regarding insulin use whether Type 1, 2 or gestational diabetes    Storage of insulin    Disposal     Traveling with medications   e. Barriers to medication adherence      The participant    Can describe the expected action & side effects of prescribed diabetes medications:  Yes   Can demonstrate injection technique (if applicable): Yes         DATE DSMES TOPIC EVALUATION     5/16/2022 HOW DOES PHYSICAL ACTIVITY AFFECT MY DIABETES?   a. Benefits of physical activity   b. Beginning a program of physical activity    Walking    Pedometers    Goal setting   c. Structured physical activity program    Aerobic activity    Resistance    Flexibility    Balance   d. Physical activity program progression   e. Safety issues   f. Barriers to physical activity   g. Facilitators of physical activity        The participant has established a regular physical activity plan: Yes           Ronal Carrasco RN on 5/16/2022 at 3:56 PM    I have personally reviewed the health record, including provider notes, laboratory data and current medications before making these care and education recommendations. The time spent in this effort is included in the total time.   Total minutes: 120

## 2022-05-23 ENCOUNTER — CLINICAL SUPPORT (OUTPATIENT)
Dept: DIABETES SERVICES | Age: 57
End: 2022-05-23
Payer: MEDICARE

## 2022-05-23 DIAGNOSIS — E11.9 TYPE 2 DIABETES MELLITUS WITHOUT COMPLICATION, WITHOUT LONG-TERM CURRENT USE OF INSULIN (HCC): Primary | ICD-10-CM

## 2022-05-23 PROCEDURE — G0109 DIAB MANAGE TRN IND/GROUP: HCPCS

## 2022-05-23 NOTE — PROGRESS NOTES
[facility] for Diabetes Health  Diabetes Self-Management Education & Support Program  Encounter Note    SUMMARY  Diabetes self-care management training was completed related to healthy coping and problem solving. The participant will return in 6 weeks to review DSMES related to reducing risks, healthy eating, monitoring, taking medications, physical activity, healthy coping and problem solving. The participant did not identify SMART Goal(s) and will practice knowledge and skills related to reducing risks, healthy eating and monitoring, being active and medications and healthy coping and problem solving to improve diabetes self-management. EVALUATION:  Participant states that she feels better about managing her diabetes. States that she feels that she has healthy coping skills and will talk with family or friends when she needs assistance. She also states that when problem solving, she will write things out and develop at plan to problem solve. Participant will return in 6 weeks for follow up    RECOMMENDATIONS:  Continue using skills learned/obtained in DSMES classes. Return in 6 weeks for follow up     [address]:  HOW DO I FIND SUPPORT TO TACKLE THIS CONDITION? [place] BLOOD GLUCOSES? 60      Next provider visit is scheduled for 6 weeks            DATE DSMES TOPIC EVALUATION     5/23/2022 HOW DO I FIND SUPPORT TO TACKLE THIS CONDITION?   a. Normal responses to diabetes diagnosis or complication    Shock    Anger & resentment    Guilt/self-blame    Sadness & worry    Depression     Anxiety    Pregnancy   b. Constructive strategies to normal responses     Exploring feelings & attitudes    Motivation: Cost versus benefits analysis    Problem-solving: Chain analysis    Obtaining support: Communicating   i. Family & friends   ii. Health team   iii.  Community   c. Stress    Symptoms    Managing stress    Fill your tool kit        The participant can identify people that support them in caring for their diabetes health: patient and family and friends      The participant would like to pursue the following in keeping themselves healthy after completing the program:  American Diabetes Association       DATE DSMES TOPIC EVALUATION     5/23/2022 HOW DO I FIGURE OUT WHAT'S INFLUENCING MY BLOOD GLUCOSES?   a. Problem solving using SOAR    Set goals    Sort options    Arrive at a plan    Reaffirm plan   b. Common problems in diabetes self-care    Hypoglycemia    Hyperglycemia    Sick days   c. Pattern management   d. Disaster preparedness plan        The participant has an action plan for    Hypoglycemia: Yes   Hyperglycemia: Yes   Sick days: Yes   During disasters: Yes       Lucina Pete RN on 5/23/2022 at 3:35 PM    I have personally reviewed the health record, including provider notes, laboratory data and current medications before making these care and education recommendations. The time spent in this effort is included in the total time.   Total minutes: 120

## 2022-06-07 ENCOUNTER — CLINICAL SUPPORT (OUTPATIENT)
Dept: DIABETES SERVICES | Age: 57
End: 2022-06-07
Payer: MEDICARE

## 2022-06-07 DIAGNOSIS — E11.9 TYPE 2 DIABETES MELLITUS WITHOUT COMPLICATION, WITHOUT LONG-TERM CURRENT USE OF INSULIN (HCC): Primary | ICD-10-CM

## 2022-06-07 PROCEDURE — G0108 DIAB MANAGE TRN  PER INDIV: HCPCS | Performed by: DIETITIAN, REGISTERED

## 2022-06-07 NOTE — PROGRESS NOTES
[employer] [facility]  Diabetes Self-Management Education & Support Program  Encounter Note    SUMMARY  Diabetes self-care management training was completed related to reducing risks. The participant will  to continue DSMES related to reducing risks, healthy eating, monitoring, taking medications, physical activity, healthy coping and problem solving. The participant did identify SMART Goal(s) and will practice knowledge and skills related to reducing risks, healthy eating and monitoring, being active and medications and healthy coping and problem solving to improve diabetes self-management. EVALUATION:  Ms Michelle Sheriff shared that she has experienced hypoglycemia three times since last class. She shared appropriate treatment; Her number was 67 mg/dL, she ate 3 starburst, waited 15 minutes and rechecked, it was 124 mg/dL. She shared that her 5/27/2022 A1c: 5.9% which is down from 7.0%. she has not been to the dentist in years; expressed anxiety over someone \"being in her mouth\". She will go when she has pain. She is up to date on annual diabetic eye doctor. Her PCP has looked at her feet, but she has not had a diabetic foot exam. She has had the flu vaccine. She has had her Pneumonia vaccine. 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 Ms. Michelle Sheriff uses positive self talk and exercising: chair yoga for 25 minutes every morning and PT exercises for ~15 minutes per day. RECOMMENDATIONS:  1. Schedule a dental cleaning/exam  2. Schedule a diabetic foot exam  3. Complete Personal Care record  4. Monitor/track/log BG, meals and activity on log sheet in workbook    TOPICS DISCUSSED TODAY:  HOW DO I STAY HEALTHY? 61    Next provider visit is scheduled for DSMES post program assessment on 7/11/2022       SMART GOAL(S)  1. Check my blood glucose levels when I feel signs and symptoms of hypoglycemia or hyperglycemia over the next week.   ACHIEVEMENT OF GOAL(S) : 0-24% DATE DSMES TOPIC EVALUATION     6/7/2022 HOW DO I STAY HEALTHY?   a. Prevention    Vaccinations    Preconception care (if applicable)  b. Examinations    Eye     Foot   c. Diabetic complications' prevention   [address]      The participant has a personal diabetes care record to keep abreast of diabetes health Yes     The participant needs to address completing personal care record; setting up appointment with dentist for preventative cleanings and schedule a diabetic foot exam.           Miles Reddy RD on 6/7/2022 at 1:02 PM    I have personally reviewed the health record, including provider notes, laboratory data and current medications before making these care and education recommendations. The time spent in this effort is included in the total time.   Total minutes: 60

## 2022-07-11 ENCOUNTER — CLINICAL SUPPORT (OUTPATIENT)
Dept: DIABETES SERVICES | Age: 57
End: 2022-07-11
Payer: MEDICARE

## 2022-07-11 DIAGNOSIS — E11.9 TYPE 2 DIABETES MELLITUS WITHOUT COMPLICATION, WITHOUT LONG-TERM CURRENT USE OF INSULIN (HCC): Primary | ICD-10-CM

## 2022-07-11 PROCEDURE — G0108 DIAB MANAGE TRN  PER INDIV: HCPCS | Performed by: DIETITIAN, REGISTERED

## 2022-07-11 NOTE — PROGRESS NOTES
[address] [place] [address] for Diabetes Health  Diabetes Self-Management Education & Support Program  Post-program Evaluation    EVALUATION @ COMPLETION OF THE PROGRAM    Larissa Edouard completed 9 hours of diabetes self-management education. The participant acquired new knowledge and demonstrated new skills during the program.     The participant worked on the following SMART GOAL(s) to improve their diabetes self-management:    1. 1.Practice building a balanced meal for one meal per day at least 7 times over the next week  ACHIEVEMENT OF GOAL(S) : %    2. 1.Read labels of packaged foods 6 times over the next week  ACHIEVEMENT OF GOAL(S) : %    3. 1.Check my blood glucose levels when I feel signs and symptoms of hypoglycemia or hyperglycemia over the next week  ACHIEVEMENT OF GOAL(S) : %    The participant's pre-program A1c was The post-evaluation A1c is 5.9 mg/dL in June 2022. The participant improved their Diabetes Skills, Confidence and Preparedness Index (scored out of 7): Total score: 6.1  Skills: 5.9  Confidence: 5.9  Preparedness: 6.8    FINAL RECOMMENDATIONS:  Schedule preventative examination with dentist and diabetic eye exam.    Next provider visit is scheduled for TBD. Tequila Ambriz RD on 7/11/2022     Metric Patient's responses (7/11/2022) Areas for improvement     Healthy Eating       The participant is using the Healthy Plate to control carbohydrate intake - No    The participant reads food labels accurately - Yes     Prepare balanced meals for Breakfast and Lunch. She uses healthy plate for dinner. Being Active       The participant performs physical activity where your heart beats faster and your breathing is harder than normal for 30 minutes or more? PT and yoga every day: 7 day(s)     In a typical week, the participant performs physical activity 7 day(s)     Twenty five minutes per day of yoga; looking into a stationary bike.      Monitoring   The participant is monitoring their blood sugars? Yes    The participant is monitoring 3x/day    Blood sugar ranges are  mg/dL  Breakfast:  mg/dL   2H post prandial: 120's mg/dL   2H post prandial: less than 180 mg/dL    The participant improved their A1c - Yes    The participant understands the meaning of the A1c - Yes          Taking Medications   The participant understands what their diabetes medications do Yes    The participant can afford your diabetes medications Yes    The participant does not miss doses of their diabetes medications - never          Healthy Coping     The participant feels supported by family, friends and others related to their diabetes self-care practices - Yes    The participant plans on utilizing the following community resources after completion of the program: Diabetes Online Community        Reducing Risks   Vaccines:  Influenza: There is no immunization history on file for this patient. Pneumococcal:   There is no immunization history on file for this patient. Hepatitis:   There is no immunization history on file for this patient. Examinations:  Diabetic Foot and Eye Exam HM Status   Topic Date Due    Diabetic Foot Care  Never done    Eye Exam  Never done        Dental exam: last appointment was: long time    Foot exam: Nurse came to the house and prefromed foot exam using tuning fork and monofilament less than two weeks okay: June 2022    Heart Protection:  BP Readings from Last 2 Encounters:   05/02/17 (!) 140/98   03/14/17 130/90        No results found for: LDL, LDLC, DLDLP     Key Anti-Platelet Anticoagulant Meds     The patient is on no antiplatelet meds or anticoagulants.            Kidney Protection:  No results found for: Nu Baker, TOM2, Lola [address], MCAU, [address] [facility], MCALPOCT   The participant would benefit from additional attention to:    Vaccines:  [] Influenza  [] Pneumococcal  [] Hepatitis    Examinations:  [] Dilated eye exam  [x] Dental exam  [] Foot exam    Other:  [] Reviewing long-term complications      Dilated eye exam is scheduled for August 2022       Problem Solving   Hypoglycemia Management:  The participant knows the signs and symptoms of hypoglycemia: She experiences hypoglycemia unawarness: she knows only after a check    The participant knows how to prevent hypoglycemia: consistent meals/snack time    The participant knows how to treat hypoglycemia: Rule of 15    Hyperglycemia Management:  The participant knows their signs and symptoms of hyperglycemia: Fatigue     The participant knows how to prevent hyperglycemia: focus on carbohydrate counting/meal planning    Sick Day Management:  The participant knows what to do differently on sick days: Stay hydrated, Take diabetes medication as instructed by provider     Pattern Management:  The participant can notice blood glucose patterns when looking at their blood glucose readings: Yes    How do you use the blood glucose readings from your meter or logbook: understand how body responds to meals      Note: Content derived from the American Association of Diabetes Educators' Diabetes Education Curriculum: A Guide to Successful Self-Management (3rd edition)      I have personally reviewed the health record, including provider notes, laboratory data and current medications before making these care and education recommendations. The time spent in this effort is included in the total time.   Total minutes: 30      Overall SCPI score: 6.1 Skills Score: 5.9  Low: Reducing Risks(Q9) Confidence Score: 5.9  Low: Healthy Coping(Q2) Preparedness Score: 6.8  Low: Healthy Coping(Q3)  Healthy Eating Score: 6.3  Low: Skills(Q1),Confidence(Q1),Confidence(Q4) Taking Medication Score: 6.0  Low: Skills(Q2) Blood Sugar Monitoring Score: 6.2  Low: Skills(Q3),Skills(Q4),Skills(Q8),Confidence(Q6) Reducing Risks Score: 6.0  Low: XKJFFR(J7)  Problem Solving Score: 6.3  Low: Skills(Q6),Confidence(Q7) Healthy Coping Score: 5.7  Low: Confidence(Q2) Being Active Score: 6.5  Low: Confidence(Q5)    Skills/Knowledge Questions  1. I know how to plan meals that have the best balance between carbohydrates, proteins and vegetables. 6  2. I know how my diabetes medications (pills, injectables and/or insulin) work in my body. 6  3. I know when to check my blood sugar if I want to see how my body responded to a meal. 6  4. I know when to check my blood sugars to determine if my medication or insulin doses are correct. 6  5. I know what to do to prevent a low blood sugar when I exercise (either before, during, or after). 6  6. When I am sick, I know what to do differently with my diabetes management. 6  7. I know how stress can affect my diabetes management. 6  8. When I look at my blood sugars over a given week, I can explain what my blood sugar pattern is. 6  9. I know what my target levels are for A1c, blood pressure and cholesterol. 5  Confidence Questions  1. I am confident that I can plan balanced meals and snacks. 6  2. I am confident that I can manage my stress. 5  3. I am confident that I can prevent a low blood sugar during or after exercise. 6  4. I am confident that the next time I eat out, I will be able to choose foods that best keep my blood sugars in target. 6  5. I am confident I can include exercise into my schedule. 6  6. I am confident that I can use my daily blood sugars to adjust my diet, my activity, and/or my insulin. 6  7. When something out of my normal routine happens, I am confident that I can problem-solve and keep my diabetes on track. 6  Preparedness Questions  1. Within the next month, I will begin to eat more balanced meals and snacks. 8  2. Within the next month, I will choose an exercise activity and I will start fitting it into my schedule. 8  3. Within the next month, I will make a list of stress management options that work for me. 6  4. Within the next month, I will consistently plan ahead to prevent low blood sugars. 7  5.  Within the next month, I will start adjusting my insulin doses on my own. 0  6. Within the next month, I will begin making changes to my diabetes management based on my daily blood sugars (eg - eating, activity and/or insulin). 8  7. Within the next month, I will begin making changes to my diabetes management to meet my overall goals (eg - eating, activity and/or insulin).  8

## 2023-05-20 RX ORDER — DULOXETIN HYDROCHLORIDE 60 MG/1
60 CAPSULE, DELAYED RELEASE ORAL DAILY
COMMUNITY

## 2023-05-20 RX ORDER — DICLOFENAC SODIUM 75 MG/1
TABLET, DELAYED RELEASE ORAL 2 TIMES DAILY
COMMUNITY

## 2023-05-20 RX ORDER — GABAPENTIN 300 MG/1
CAPSULE ORAL
COMMUNITY
Start: 2017-05-02

## 2023-05-20 RX ORDER — BUPROPION HYDROCHLORIDE 300 MG/1
300 TABLET ORAL EVERY MORNING
COMMUNITY

## 2023-05-20 RX ORDER — HYDROCODONE BITARTRATE AND ACETAMINOPHEN 7.5; 325 MG/1; MG/1
1 TABLET ORAL EVERY 6 HOURS PRN
COMMUNITY

## 2023-05-20 RX ORDER — PANTOPRAZOLE SODIUM 40 MG/1
40 TABLET, DELAYED RELEASE ORAL DAILY
COMMUNITY

## 2023-05-20 RX ORDER — MELOXICAM 15 MG/1
TABLET ORAL
COMMUNITY

## 2023-05-20 RX ORDER — BACLOFEN 20 MG/1
20 TABLET ORAL 3 TIMES DAILY
COMMUNITY

## 2023-05-20 RX ORDER — AMLODIPINE BESYLATE 5 MG/1
5 TABLET ORAL DAILY
COMMUNITY

## 2023-05-20 RX ORDER — TOPIRAMATE 100 MG/1
TABLET, FILM COATED ORAL 2 TIMES DAILY
COMMUNITY

## 2023-05-20 RX ORDER — MORPHINE SULFATE 15 MG/1
15 TABLET ORAL 2 TIMES DAILY
COMMUNITY

## 2023-05-20 RX ORDER — ARIPIPRAZOLE 10 MG/1
10 TABLET ORAL DAILY
COMMUNITY

## 2023-12-13 ENCOUNTER — HOSPITAL ENCOUNTER (EMERGENCY)
Facility: HOSPITAL | Age: 58
Discharge: HOME | End: 2023-12-13
Attending: EMERGENCY MEDICINE
Payer: MEDICARE

## 2023-12-13 ENCOUNTER — APPOINTMENT (OUTPATIENT)
Facility: HOSPITAL | Age: 58
End: 2023-12-13
Payer: MEDICARE

## 2023-12-13 VITALS
HEART RATE: 100 BPM | WEIGHT: 241 LBS | HEIGHT: 60 IN | BODY MASS INDEX: 47.32 KG/M2 | DIASTOLIC BLOOD PRESSURE: 70 MMHG | SYSTOLIC BLOOD PRESSURE: 121 MMHG | TEMPERATURE: 100.1 F | OXYGEN SATURATION: 91 % | RESPIRATION RATE: 18 BRPM

## 2023-12-13 DIAGNOSIS — I50.9 CONGESTIVE HEART FAILURE, UNSPECIFIED HF CHRONICITY, UNSPECIFIED HEART FAILURE TYPE (HCC): Primary | ICD-10-CM

## 2023-12-13 DIAGNOSIS — F11.90 CHRONIC NARCOTIC USE: ICD-10-CM

## 2023-12-13 DIAGNOSIS — R00.0 TACHYCARDIA: ICD-10-CM

## 2023-12-13 LAB
ALBUMIN SERPL-MCNC: 3.8 G/DL (ref 3.5–5.2)
ALBUMIN/GLOB SERPL: 1.1 (ref 1.1–2.2)
ALP SERPL-CCNC: 100 U/L (ref 35–104)
ALT SERPL-CCNC: 11 U/L (ref 10–35)
ANION GAP SERPL CALC-SCNC: 13 MMOL/L (ref 5–15)
AST SERPL-CCNC: 15 U/L (ref 10–35)
BASOPHILS # BLD: 0 K/UL (ref 0–1)
BASOPHILS NFR BLD: 0 % (ref 0–1)
BILIRUB SERPL-MCNC: 0.3 MG/DL (ref 0.2–1)
BUN SERPL-MCNC: 18 MG/DL (ref 8–23)
BUN/CREAT SERPL: 26 (ref 12–20)
CALCIUM SERPL-MCNC: 9.5 MG/DL (ref 8.8–10.2)
CHLORIDE SERPL-SCNC: 103 MMOL/L (ref 98–107)
CO2 SERPL-SCNC: 25 MMOL/L (ref 22–29)
CREAT SERPL-MCNC: 0.69 MG/DL (ref 0.5–0.9)
DIFFERENTIAL METHOD BLD: ABNORMAL
EOSINOPHIL # BLD: 0.2 K/UL (ref 0–0.4)
EOSINOPHIL NFR BLD: 1 %
ERYTHROCYTE [DISTWIDTH] IN BLOOD BY AUTOMATED COUNT: 14.6 % (ref 11.5–14.5)
GLOBULIN SER CALC-MCNC: 3.5 G/DL (ref 2–4)
GLUCOSE SERPL-MCNC: 150 MG/DL (ref 65–100)
HCT VFR BLD AUTO: 44.3 % (ref 35–47)
HGB BLD-MCNC: 14.7 G/DL (ref 11.5–16)
IMM GRANULOCYTES # BLD AUTO: 0.1 K/UL (ref 0–0.04)
IMM GRANULOCYTES NFR BLD AUTO: 0 % (ref 0–0.5)
LYMPHOCYTES # BLD: 1.3 K/UL (ref 0.8–3.5)
LYMPHOCYTES NFR BLD: 8 % (ref 12–49)
MCH RBC QN AUTO: 31.4 PG (ref 26–34)
MCHC RBC AUTO-ENTMCNC: 33.2 G/DL (ref 30–36.5)
MCV RBC AUTO: 94.7 FL (ref 80–99)
MONOCYTES # BLD: 0.6 K/UL (ref 0–1)
MONOCYTES NFR BLD: 4 % (ref 5–13)
NEUTS SEG # BLD: 13.1 K/UL (ref 1.8–8)
NEUTS SEG NFR BLD: 87 % (ref 32–75)
NRBC # BLD: 0 K/UL (ref 0–0.01)
NRBC BLD-RTO: 0 PER 100 WBC
NT PRO BNP: 1774 PG/ML
PLATELET # BLD AUTO: 291 K/UL (ref 150–400)
PMV BLD AUTO: 9.3 FL (ref 8.9–12.9)
POTASSIUM SERPL-SCNC: 4.7 MMOL/L (ref 3.5–5.1)
PROT SERPL-MCNC: 7.3 G/DL (ref 6.4–8.3)
RBC # BLD AUTO: 4.68 M/UL (ref 3.8–5.2)
SODIUM SERPL-SCNC: 141 MMOL/L (ref 136–145)
TROPONIN I BLD-MCNC: <0.04 NG/ML (ref 0–0.08)
WBC # BLD AUTO: 15.3 K/UL (ref 3.6–11)

## 2023-12-13 PROCEDURE — 83880 ASSAY OF NATRIURETIC PEPTIDE: CPT

## 2023-12-13 PROCEDURE — 96361 HYDRATE IV INFUSION ADD-ON: CPT

## 2023-12-13 PROCEDURE — 6360000002 HC RX W HCPCS: Performed by: EMERGENCY MEDICINE

## 2023-12-13 PROCEDURE — 84484 ASSAY OF TROPONIN QUANT: CPT

## 2023-12-13 PROCEDURE — 99285 EMERGENCY DEPT VISIT HI MDM: CPT

## 2023-12-13 PROCEDURE — 71045 X-RAY EXAM CHEST 1 VIEW: CPT

## 2023-12-13 PROCEDURE — 80053 COMPREHEN METABOLIC PANEL: CPT

## 2023-12-13 PROCEDURE — 2580000003 HC RX 258: Performed by: EMERGENCY MEDICINE

## 2023-12-13 PROCEDURE — 36415 COLL VENOUS BLD VENIPUNCTURE: CPT

## 2023-12-13 PROCEDURE — 93005 ELECTROCARDIOGRAM TRACING: CPT | Performed by: EMERGENCY MEDICINE

## 2023-12-13 PROCEDURE — 96374 THER/PROPH/DIAG INJ IV PUSH: CPT

## 2023-12-13 PROCEDURE — 85025 COMPLETE CBC W/AUTO DIFF WBC: CPT

## 2023-12-13 RX ORDER — 0.9 % SODIUM CHLORIDE 0.9 %
1000 INTRAVENOUS SOLUTION INTRAVENOUS ONCE
Status: COMPLETED | OUTPATIENT
Start: 2023-12-13 | End: 2023-12-13

## 2023-12-13 RX ORDER — FUROSEMIDE 20 MG/1
20 TABLET ORAL 2 TIMES DAILY
Qty: 3 TABLET | Refills: 0 | Status: SHIPPED | OUTPATIENT
Start: 2023-12-13 | End: 2023-12-15

## 2023-12-13 RX ORDER — MORPHINE SULFATE 4 MG/ML
4 INJECTION, SOLUTION INTRAMUSCULAR; INTRAVENOUS
Status: COMPLETED | OUTPATIENT
Start: 2023-12-13 | End: 2023-12-13

## 2023-12-13 RX ADMIN — MORPHINE SULFATE 4 MG: 4 INJECTION, SOLUTION INTRAMUSCULAR; INTRAVENOUS at 04:22

## 2023-12-13 RX ADMIN — SODIUM CHLORIDE 1000 ML: 9 INJECTION, SOLUTION INTRAVENOUS at 02:57

## 2023-12-13 ASSESSMENT — PAIN - FUNCTIONAL ASSESSMENT: PAIN_FUNCTIONAL_ASSESSMENT: 0-10

## 2023-12-13 ASSESSMENT — PAIN SCALES - GENERAL: PAINLEVEL_OUTOF10: 0

## 2023-12-13 NOTE — ED NOTES
Pt resting in bed at this time. Aox4. NAD. Cardiac monitors x3. IVF running. Comfort measures offered. Pt declined. Will continue to monitor.      Juan Alberto Omalley RN  12/13/23 [id number]

## 2023-12-13 NOTE — ED TRIAGE NOTES
Pt ambulatory to ED complaining of SOB starting 30 minutes ago. Similar episode last night that self resolved. Tachypnea noted in triage, able to speak in full sentences. Per family member, pt appears more pale than usual. Hx htn, diabetes.

## 2023-12-13 NOTE — ED NOTES
Pt given discharge instructions, pt education, 1 prescription and follow up information. Pt verbalizes understanding. All questions answered. Pt discharged to home in private vehicle with family, ambulatory. Pt A&O x4, RA, pain controlled.       Olaf Zambrano RN  12/13/23 [id number]

## 2023-12-14 LAB
EKG ATRIAL RATE: 124 BPM
EKG DIAGNOSIS: NORMAL
EKG P AXIS: 50 DEGREES
EKG P-R INTERVAL: 156 MS
EKG Q-T INTERVAL: 300 MS
EKG QRS DURATION: 88 MS
EKG QTC CALCULATION (BAZETT): 431 MS
EKG R AXIS: -37 DEGREES
EKG T AXIS: 87 DEGREES
EKG VENTRICULAR RATE: 124 BPM

## 2023-12-14 PROCEDURE — 93010 ELECTROCARDIOGRAM REPORT: CPT | Performed by: SPECIALIST

## 2024-03-19 ENCOUNTER — TRANSCRIBE ORDERS (OUTPATIENT)
Facility: HOSPITAL | Age: 59
End: 2024-03-19

## 2024-03-19 DIAGNOSIS — I42.0 CONGESTIVE CARDIOMYOPATHY (HCC): Primary | ICD-10-CM

## 2024-03-19 DIAGNOSIS — R06.02 SHORTNESS OF BREATH: ICD-10-CM

## 2024-05-20 ENCOUNTER — HOSPITAL ENCOUNTER (OUTPATIENT)
Facility: HOSPITAL | Age: 59
Discharge: HOME | End: 2024-05-23
Attending: ANESTHESIOLOGY
Payer: MEDICARE

## 2024-05-20 DIAGNOSIS — M47.816 LUMBAR SPONDYLOSIS: ICD-10-CM

## 2024-05-20 PROCEDURE — 72148 MRI LUMBAR SPINE W/O DYE: CPT

## 2024-06-05 ENCOUNTER — HOSPITAL ENCOUNTER (OUTPATIENT)
Facility: HOSPITAL | Age: 59
Discharge: HOME | End: 2024-06-08
Attending: INTERNAL MEDICINE
Payer: MEDICARE

## 2024-06-05 VITALS
OXYGEN SATURATION: 96 % | RESPIRATION RATE: 17 BRPM | DIASTOLIC BLOOD PRESSURE: 53 MMHG | SYSTOLIC BLOOD PRESSURE: 113 MMHG | HEART RATE: 60 BPM

## 2024-06-05 DIAGNOSIS — I42.0 CONGESTIVE CARDIOMYOPATHY (HCC): ICD-10-CM

## 2024-06-05 DIAGNOSIS — R06.02 SHORTNESS OF BREATH: ICD-10-CM

## 2024-06-05 PROCEDURE — 6360000004 HC RX CONTRAST MEDICATION: Performed by: INTERNAL MEDICINE

## 2024-06-05 PROCEDURE — 6370000000 HC RX 637 (ALT 250 FOR IP): Performed by: INTERNAL MEDICINE

## 2024-06-05 PROCEDURE — 75574 CT ANGIO HRT W/3D IMAGE: CPT

## 2024-06-05 PROCEDURE — 82565 ASSAY OF CREATININE: CPT

## 2024-06-05 RX ORDER — METOPROLOL TARTRATE 1 MG/ML
5 INJECTION, SOLUTION INTRAVENOUS
Status: DISCONTINUED | OUTPATIENT
Start: 2024-06-05 | End: 2024-06-09 | Stop reason: HOSPADM

## 2024-06-05 RX ORDER — NITROGLYCERIN 0.4 MG/1
0.4 TABLET SUBLINGUAL EVERY 5 MIN PRN
Status: COMPLETED | OUTPATIENT
Start: 2024-06-05 | End: 2024-06-05

## 2024-06-05 RX ADMIN — NITROGLYCERIN 0.4 MG: 0.4 TABLET, ORALLY DISINTEGRATING SUBLINGUAL at 08:43

## 2024-06-05 RX ADMIN — IOPAMIDOL 100 ML: 755 INJECTION, SOLUTION INTRAVENOUS at 08:48

## 2024-06-05 RX ADMIN — NITROGLYCERIN 0.4 MG: 0.4 TABLET, ORALLY DISINTEGRATING SUBLINGUAL at 08:51

## 2024-06-05 NOTE — PROGRESS NOTES
0830- Pt walked back to radiology for CT cardiac study. Pt A&OX 4. Verified that pre medication Atenol was taken. Pt placed in gown and VS taken.   PIV # 20 placed RT AC with brisk blood return and flushed with saline.   0855- Pt taken to CT and placed supine on table. Nitro given per protocol.   0905- Pt tolerated the CT scan well. Orthostatic VS stable.   PIV #20 RT AC removed and gauze and coban placed over site. Pt discharge to the [facility].

## 2024-06-06 ENCOUNTER — HOSPITAL ENCOUNTER (OUTPATIENT)
Facility: HOSPITAL | Age: 59
Discharge: HOME | End: 2024-06-06
Payer: MEDICARE

## 2024-06-06 DIAGNOSIS — R06.02 SHORTNESS OF BREATH: ICD-10-CM

## 2024-06-06 DIAGNOSIS — I42.0 CONGESTIVE CARDIOMYOPATHY (HCC): ICD-10-CM

## 2024-06-06 PROCEDURE — 75580 N-INVAS EST C FFR SW ALY CTA: CPT

## 2024-06-06 PROCEDURE — 75580 N-INVAS EST C FFR SW ALY CTA: CPT | Performed by: INTERNAL MEDICINE

## 2024-06-08 LAB — CREAT BLD-MCNC: 0.9 MG/DL (ref 0.6–1.3)

## 2025-02-24 NOTE — TELEPHONE ENCOUNTER
----- Message from Christiano Holland sent at 4/6/2017  9:48 AM EDT -----  Regarding: Dr. Raman Jaimes called regarding informing the doctor that she has had her MRI completed and she would like a call back.  Pt number [phone]
Contacted patient and informed her of results.  Scheduled her f/u for Monday, May 01, 2017 01:00 PM
Please advise with results.
Severe spinal stenosis in lower back and arthritis in hips and SI joints. Will need to follow up to discuss in further detail and discuss any injection options.
None